# Patient Record
(demographics unavailable — no encounter records)

---

## 2024-10-31 NOTE — DISCUSSION/SUMMARY
[Normal Growth] : growth [Normal Development] : development [No Elimination Concerns] : elimination [No Feeding Concerns] : feeding [No Medications] : ~He/She~ is not on any medications [Patient] : patient [Father] : father [Full Activity without restrictions including Physical Education & Athletics] : Full Activity without restrictions including Physical Education & Athletics [I have examined the above-named student and completed the preparticipation physical evaluation. The athlete does not present apparent clinical contraindications to practice and participate in sport(s) as outlined above. A copy of the physical exam is on r] : I have examined the above-named student and completed the preparticipation physical evaluation. The athlete does not present apparent clinical contraindications to practice and participate in sport(s) as outlined above. A copy of the physical exam is on record in my office and can be made available to the school at the request of the parents. If conditions arise after the athlete has been cleared for participation, the physician may rescind the clearance until the problem is resolved and the potential consequences are completely explained to the athlete (and parents/guardians). [FreeTextEntry3] : FAMILY CARDIAC HX REVIEWED (cardiac screen negative) [] : The components of the vaccine(s) to be administered today are listed in the plan of care. The disease(s) for which the vaccine(s) are intended to prevent and the risks have been discussed with the caretaker.  The risks are also included in the appropriate vaccination information statements which have been provided to the patient's caregiver.  The caregiver has given consent to vaccinate. [FreeTextEntry1] :  Bonnie 10 year old boy Evaluated and cleared by Cardiology in 2017 for murmur, dx with innocent murmur, no F/U needed Previously underweight throughout childhood until 4 years ago Growth spurt since last appt (coinciding with onset of puberty), BMI in healthy range Excels in school Exam notable for inferior turbinate and tonsillar hypertrophy, innocent murmur, dry skin with postinflammatory hyperpigmentation  1) Health maintenance - Continue balanced diet - Discussed pre-adolescent safety - Routine F/U with Dentist and Ophtho - Tdap & Flu vaccines administered by RN  - Return for annual WCC  2) Snoring - Monitor for apneas or other sx of DANNI - Trial OTC Flonase PRN

## 2024-10-31 NOTE — HISTORY OF PRESENT ILLNESS
[Sugar drinks] : sugar drinks [Fruit] : fruit [Vegetables] : vegetables [Meat] : meat [Dairy] : dairy [Eats healthy meals and snacks] : eats healthy meals and snacks [Father] : father [___ stools per day] : [unfilled]  stools per day [Brushing teeth twice/d] : brushing teeth twice per day [Yes] : Patient goes to dentist yearly [Toothpaste] : Primary Fluoride Source: Toothpaste [Appropiate parent-child-sibling interaction] : appropriate parent-child-sibling interaction [Has Friends] : has friends [Grade ___] : Grade [unfilled] [Adequate social interactions] : adequate social interactions [Adequate behavior] : adequate behavior [Adequate attention] : adequate attention [No] : No cigarette smoke exposure [Appropriately restrained in motor vehicle] : appropriately restrained in motor vehicle [Supervised outdoor play] : supervised outdoor play [Wears helmet and pads] : wears helmet and pads [Parent knows child's friends] : parent knows child's friends [Monitored computer use] : monitored computer use [Up to date] : Up to date [Eggs] : eggs [Normal] : Normal [Exposure to electronic nicotine delivery system] : No exposure to electronic nicotine delivery system [Supervised around water] : supervised around water [FreeTextEntry7] : no interval ER/UC visits  [de-identified] : varied diet, eats large portions  [FreeTextEntry8] : occasional constipation [FreeTextEntry9] : physical activity includes swimming and basketball; excessive screen time (video games) on weekends  [FreeTextEntry3] : adequate sleep; chronic loud snoring, no witnessed apneas [de-identified] : excellent student, in gifted & talented program [de-identified] : lives with parents and 4 year old brother

## 2024-10-31 NOTE — PHYSICAL EXAM
[Alert] : alert [No Acute Distress] : no acute distress [Cooperative] : cooperative [Normocephalic] : normocephalic [Conjunctivae with no discharge] : conjunctivae with no discharge [PERRL] : PERRL [EOMI Bilateral] : EOMI bilateral [Clear Tympanic membranes with present light reflex and bony landmarks] : clear tympanic membranes with present light reflex and bony landmarks [Pink Nasal Mucosa] : pink nasal mucosa [Uvula Midline] : uvula midline [Nonerythematous Oropharynx] : nonerythematous oropharynx [No Caries] : no caries [Supple, full passive range of motion] : supple, full passive range of motion [No Palpable Masses] : no palpable masses [Symmetric Chest Rise] : symmetric chest rise [Clear to Auscultation Bilaterally] : clear to auscultation bilaterally [Regular Rate and Rhythm] : regular rate and rhythm [Normal S1, S2 present] : normal S1, S2 present [Soft] : soft [NonTender] : non tender [Non Distended] : non distended [Normoactive Bowel Sounds] : normoactive bowel sounds [Gynecomastia] : gynecomastia [Efraín: _____] : Efraín [unfilled] [Uncircumcised] : uncircumcised [Testicles Descended Bilaterally] : testicles descended bilaterally [No pain or deformities with palpation of bone, muscles, joints] : no pain or deformities with palpation of bone, muscles, joints [Normal Muscle Tone] : normal muscle tone [Straight] : straight [No Scoliosis] : no scoliosis [Cranial Nerves Grossly Intact] : cranial nerves grossly intact [FreeTextEntry1] : pleasant, conversant, well-appearing [FreeTextEntry4] : inferior turbinate hypertrophy  [de-identified] : 3+ b/l tonsillar enlargement, no exudate [FreeTextEntry8] : 1/6 vibratory systolic murmur at LSB [FreeTextEntry9] : very ticklish [FreeTextEntry6] : no inguinal hernias [de-identified] : normal strength in all extremities  [de-identified] : dry skin, multiple tiny hyperpigmented macules on upper extremities

## 2024-10-31 NOTE — REVIEW OF SYSTEMS
[Nasal Discharge] : nasal discharge [Nasal Congestion] : nasal congestion [Snoring] : snoring [Wheezing] : no wheezing [Cough] : no cough [Vomiting] : no vomiting [Diarrhea] : no diarrhea [Rash] : no rash [Dry Skin] : dry skin [Itching] : no itching [Negative] : Heme/Lymph

## 2025-01-29 NOTE — DISCUSSION/SUMMARY
[FreeTextEntry1] : Mild symptoms, no respiratory distress, maintaining hydration, no sign of bacterial superinfection, well appearing, normal exam, consistent with recurrent febrile infection, likely viral URI. Reviewed mycoplasma does not require antibiotics, roughly 95% improve without treatment, therefore though treatment failure is on the differential, no clinical signs of PNA and no need to retreat. Reviewed flu/covid/rsv testing, family defers today.   - Cold symptoms can last up to 10 to 14 days on average, sometimes longer - Keep your child well hydrated - Can use nasal saline drops/spray and humidifier for congestion and cough control - Can use Acetaminophen or Ibuprofen (given with food) every 6 hours as needed for discomfort or symptoms caused by fever.  - Do not use of over the counter decongestants or cough suppressants (especially if less than 6 years of age due to side effects) - Can use honey, 1 tablespoon twice daily, for cough (ONLY if older than 1 year of age, dangerous if your child is less than 1 year of age) - Call clinic for continued high fever past 48 to 72 hours for in office visit and further evaluation as this may be a sign of bacterial infection - Call office for any worsening or parental concern - Seek emergency medical attention if your child cannot tolerate anything by mouth and they pee less than 3 times in one day, patient becomes less alert or difficulty waking from sleep, have difficulty breathing (too fast or too hard), or has work of breathing such as retractions that are persistent. All of these symptoms require an emergent evaluation to rule out serious disease

## 2025-01-29 NOTE — HISTORY OF PRESENT ILLNESS
[FreeTextEntry6] : Here with Dad for recurrent fever Diagnosed with Mycoplasma at [facility] on 1/20 Was improving on abx New fatigue yesterday, cough began to get worse Tmax here, started today, 39  New headache, occipital, yesterday, now resolved with tylenol, no vision changes No vomiting, diarrhea, rashes, sore throat  Sibling with mild cough No recent travel Taking cough medicine from 1/20 to now, unsure of brand